# Patient Record
Sex: MALE | Race: WHITE
[De-identification: names, ages, dates, MRNs, and addresses within clinical notes are randomized per-mention and may not be internally consistent; named-entity substitution may affect disease eponyms.]

---

## 2017-07-20 LAB
ALBUMIN SERPL-MCNC: 4.1 GM/DL (ref 3.2–4.5)
ALT SERPL-CCNC: 20 U/L (ref 0–55)
ANION GAP SERPL CALC-SCNC: 8 MMOL/L (ref 5–14)
AST SERPL-CCNC: 19 U/L (ref 5–34)
BASOPHILS # BLD AUTO: 0 10^3/UL (ref 0–0.1)
BASOPHILS NFR BLD AUTO: 1 % (ref 0–10)
BASOPHILS NFR BLD MANUAL: 2 %
BILIRUB SERPL-MCNC: 0.7 MG/DL (ref 0.1–1)
BUN SERPL-MCNC: 13 MG/DL (ref 7–18)
BUN/CREAT SERPL: 15
CALCIUM SERPL-MCNC: 9.3 MG/DL (ref 8.5–10.1)
CHLORIDE SERPL-SCNC: 107 MMOL/L (ref 98–107)
CO2 SERPL-SCNC: 21 MMOL/L (ref 21–32)
CREAT SERPL-MCNC: 0.88 MG/DL (ref 0.6–1.3)
EOSINOPHIL # BLD AUTO: 0.3 10^3/UL (ref 0–0.3)
EOSINOPHIL NFR BLD AUTO: 6 % (ref 0–10)
EOSINOPHIL NFR BLD MANUAL: 6 %
ERYTHROCYTE [DISTWIDTH] IN BLOOD BY AUTOMATED COUNT: 13.7 % (ref 10–14.5)
GFR SERPLBLD BASED ON 1.73 SQ M-ARVRAT: > 60 ML/MIN
GLUCOSE SERPL-MCNC: 97 MG/DL (ref 70–105)
INR PPP: 1.1 (ref 0.8–1.4)
LYMPHOCYTES # BLD AUTO: 1.1 X 10^3 (ref 1–4)
LYMPHOCYTES NFR BLD AUTO: 23 % (ref 12–44)
MCH RBC QN AUTO: 32 PG (ref 25–34)
MCHC RBC AUTO-ENTMCNC: 34 G/DL (ref 32–36)
MCV RBC AUTO: 96 FL (ref 80–99)
MONOCYTES # BLD AUTO: 0.5 X 10^3 (ref 0–1)
MONOCYTES NFR BLD AUTO: 11 % (ref 0–12)
NEUTROPHILS # BLD AUTO: 2.7 X 10^3 (ref 1.8–7.8)
NEUTROPHILS NFR BLD AUTO: 60 % (ref 42–75)
NEUTS BAND NFR BLD MANUAL: 61 %
NEUTS BAND NFR BLD: 2 %
PLATELET # BLD: 202 10^3/UL (ref 130–400)
PMV BLD AUTO: 10.8 FL (ref 7.4–10.4)
POTASSIUM SERPL-SCNC: 4.5 MMOL/L (ref 3.6–5)
PROT SERPL-MCNC: 7.1 GM/DL (ref 6.4–8.2)
PROTHROMBIN TIME: 13.8 SEC (ref 12.2–14.7)
RBC # BLD AUTO: 4.42 10^6/UL (ref 4.35–5.85)
SODIUM SERPL-SCNC: 136 MMOL/L (ref 135–145)
VARIANT LYMPHS NFR BLD MANUAL: 24 %
WBC # BLD AUTO: 4.6 10^3/UL (ref 4.3–11)

## 2017-07-20 NOTE — DIAGNOSTIC IMAGING REPORT
PROCEDURE: CT angiography of the chest with contrast.



TECHNIQUE: Multiple contiguous axial images were obtained through

the chest after uneventful bolus administration of intravenous

contrast. Reconstructed CTA MIP acquisitions were also performed.



INDICATION: Hemoptysis, dyspnea, and fatigue.



FINDINGS: There is good opacification of pulmonary arteries

without intraluminal filling defect to indicate embolism. There

are numerous prominent lymph nodes throughout the mediastinum.

The largest is in the precarinal space measuring 2.5 x 1.4 cm.

There are also prominent lymph nodes in both pulmonary tamiko.

Numerous lymph nodes contain calcification. There is a small

amount of right pleural fluid. There is mild dependent

atelectasis in both lung bases. There is mild diffuse groundglass

opacity with mild diffuse centrilobular emphysema and basilar

interstitial prominence. There is no evidence of pericardial

effusion. There has been partial right hepatectomy.



IMPRESSION:

1. No CTA evidence of pulmonary embolism. Mild diffuse

groundglass opacities and interstitial prominence in the lungs

may be related to interstitial edema or pneumonitis. Component of

congestive heart failure is not excluded. Note is made of

coronary artery calcification, indicating coronary artery

disease.

2. Mild diffuse mediastinal and bilateral hilar adenopathy could

be related to previous granulomatous exposure, although clinical

correlation is recommended.



Dictated by: 



  Dictated on workstation # [id number]

## 2017-07-22 LAB — Lab: 8.6 IU/ML (ref 0.5–10)

## 2017-07-24 LAB
Lab: 0.08 IU/ML (ref 0–7.99)
Lab: 0.44 IU/ML (ref 0–0.34)

## 2017-08-10 ENCOUNTER — HOSPITAL ENCOUNTER (OUTPATIENT)
Dept: HOSPITAL 75 - RAD | Age: 67
LOS: 51 days | Discharge: HOME | End: 2017-09-30
Attending: NURSE PRACTITIONER
Payer: MEDICARE

## 2017-08-10 PROCEDURE — 87205 SMEAR GRAM STAIN: CPT

## 2017-08-10 PROCEDURE — 86480 TB TEST CELL IMMUN MEASURE: CPT

## 2017-08-10 PROCEDURE — 87116 MYCOBACTERIA CULTURE: CPT

## 2017-08-10 PROCEDURE — 36415 COLL VENOUS BLD VENIPUNCTURE: CPT

## 2017-08-10 PROCEDURE — 85007 BL SMEAR W/DIFF WBC COUNT: CPT

## 2017-08-10 PROCEDURE — 80053 COMPREHEN METABOLIC PANEL: CPT

## 2017-08-10 PROCEDURE — 87070 CULTURE OTHR SPECIMN AEROBIC: CPT

## 2017-08-10 PROCEDURE — 85027 COMPLETE CBC AUTOMATED: CPT

## 2017-08-10 PROCEDURE — 71275 CT ANGIOGRAPHY CHEST: CPT

## 2017-08-10 PROCEDURE — 85610 PROTHROMBIN TIME: CPT

## 2018-04-06 ENCOUNTER — HOSPITAL ENCOUNTER (OUTPATIENT)
Dept: HOSPITAL 75 - RAD | Age: 68
End: 2018-04-06
Attending: NURSE PRACTITIONER
Payer: MEDICARE

## 2018-04-06 DIAGNOSIS — I70.8: ICD-10-CM

## 2018-04-06 DIAGNOSIS — J44.9: ICD-10-CM

## 2018-04-06 DIAGNOSIS — R76.11: Primary | ICD-10-CM

## 2018-04-06 DIAGNOSIS — R91.8: ICD-10-CM

## 2018-04-06 DIAGNOSIS — Z85.038: ICD-10-CM

## 2018-04-06 DIAGNOSIS — Z85.05: ICD-10-CM

## 2018-04-06 LAB
BUN/CREAT SERPL: 14
CREAT SERPL-MCNC: 1.11 MG/DL (ref 0.6–1.3)
GFR SERPLBLD BASED ON 1.73 SQ M-ARVRAT: > 60 ML/MIN

## 2018-04-06 PROCEDURE — 36415 COLL VENOUS BLD VENIPUNCTURE: CPT

## 2018-04-06 PROCEDURE — 84520 ASSAY OF UREA NITROGEN: CPT

## 2018-04-06 PROCEDURE — 87116 MYCOBACTERIA CULTURE: CPT

## 2018-04-06 PROCEDURE — 87070 CULTURE OTHR SPECIMN AEROBIC: CPT

## 2018-04-06 PROCEDURE — 71275 CT ANGIOGRAPHY CHEST: CPT

## 2018-04-06 PROCEDURE — 82565 ASSAY OF CREATININE: CPT

## 2018-04-06 PROCEDURE — 87205 SMEAR GRAM STAIN: CPT

## 2018-04-06 NOTE — DIAGNOSTIC IMAGING REPORT
PROCEDURE: CT angiography of the chest with contrast.



TECHNIQUE: Multiple contiguous axial images were obtained through

the chest after uneventful bolus administration of intravenous

contrast. Reconstructed CTA MIP acquisitions were also performed.

 

INDICATION:  COPD, latent TB, shortness of air, history of liver

and colorectal cancer, cough.



COMPARISON: 07/20/2017.



FINDINGS:



The pulmonary arteries are diagnostic to the segmental level and

no pulmonary embolus is seen. There is atherosclerosis of the

aorta without significant stenosis, aneurysm, or dissection.

There is coronary atherosclerosis present. No pericardial

effusion is seen. There is mild prominence of the heart. There

are numerous mediastinal and hilar lymph nodes, some of which are

mildly prominent. This appears similar to the prior study. The

largest is a right lower paratracheal lymph node measuring up to

1.9 cm in the short axis (image 63 series 2). There is marked

atherosclerosis at the origin of the left subclavian artery, with

severe stenosis.



Mild emphysematous changes are seen in the lung apices. There is

mild groundglass opacity in the right lung, with diffuse

interstitial opacities throughout the right lung, with basilar

predominance. These findings appear increased since 07/20/2017.

No definite honeycombing is seen. No pleural effusion or

pneumothorax is seen.



Degenerative changes are seen in the spine. No acute osseous

abnormality is seen. There are postsurgical changes of the right

liver. The imaged portions of the upper abdomen are otherwise

unremarkable.



IMPRESSION:

1. No acute pulmonary embolus seen.

2. Interstitial and groundglass opacities in the lungs,

particularly on the right. This is nonspecific, may be due to

asymmetric edema or infection in the acute setting, or possibly

progressive fibrotic changes chronically.

3. 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



Dictated by: 



  Dictated on workstation # [id number]